# Patient Record
Sex: MALE | Race: WHITE | NOT HISPANIC OR LATINO
[De-identification: names, ages, dates, MRNs, and addresses within clinical notes are randomized per-mention and may not be internally consistent; named-entity substitution may affect disease eponyms.]

---

## 2017-01-04 ENCOUNTER — TRANSCRIPTION ENCOUNTER (OUTPATIENT)
Age: 47
End: 2017-01-04

## 2022-05-01 ENCOUNTER — EMERGENCY (EMERGENCY)
Facility: HOSPITAL | Age: 52
LOS: 1 days | Discharge: HOME | End: 2022-05-01
Attending: EMERGENCY MEDICINE
Payer: COMMERCIAL

## 2022-05-01 VITALS
SYSTOLIC BLOOD PRESSURE: 152 MMHG | WEIGHT: 240.08 LBS | DIASTOLIC BLOOD PRESSURE: 103 MMHG | HEIGHT: 70 IN | OXYGEN SATURATION: 98 % | HEART RATE: 89 BPM | RESPIRATION RATE: 18 BRPM

## 2022-05-01 LAB
BASOPHILS # BLD AUTO: 0.1 K/UL — SIGNIFICANT CHANGE UP (ref 0–0.2)
BASOPHILS NFR BLD AUTO: 1 % — SIGNIFICANT CHANGE UP (ref 0–2)
EOSINOPHIL # BLD AUTO: 0.15 K/UL — SIGNIFICANT CHANGE UP (ref 0–0.5)
EOSINOPHIL NFR BLD AUTO: 1.5 % — SIGNIFICANT CHANGE UP (ref 0–6)
HCT VFR BLD CALC: 44.1 % — SIGNIFICANT CHANGE UP (ref 39–50)
HGB BLD-MCNC: 15 G/DL — SIGNIFICANT CHANGE UP (ref 13–17)
IMM GRANULOCYTES NFR BLD AUTO: 0.7 % — SIGNIFICANT CHANGE UP (ref 0–1.5)
LYMPHOCYTES # BLD AUTO: 2.01 K/UL — SIGNIFICANT CHANGE UP (ref 1–3.3)
LYMPHOCYTES # BLD AUTO: 20.7 % — SIGNIFICANT CHANGE UP (ref 13–44)
MCHC RBC-ENTMCNC: 32.5 PG — SIGNIFICANT CHANGE UP (ref 27–34)
MCHC RBC-ENTMCNC: 34 GM/DL — SIGNIFICANT CHANGE UP (ref 32–36)
MCV RBC AUTO: 95.7 FL — SIGNIFICANT CHANGE UP (ref 80–100)
MONOCYTES # BLD AUTO: 0.7 K/UL — SIGNIFICANT CHANGE UP (ref 0–0.9)
MONOCYTES NFR BLD AUTO: 7.2 % — SIGNIFICANT CHANGE UP (ref 2–14)
NEUTROPHILS # BLD AUTO: 6.7 K/UL — SIGNIFICANT CHANGE UP (ref 1.8–7.4)
NEUTROPHILS NFR BLD AUTO: 68.9 % — SIGNIFICANT CHANGE UP (ref 43–77)
PLATELET # BLD AUTO: 289 K/UL — SIGNIFICANT CHANGE UP (ref 150–400)
RBC # BLD: 4.61 M/UL — SIGNIFICANT CHANGE UP (ref 4.2–5.8)
RBC # FLD: 13.1 % — SIGNIFICANT CHANGE UP (ref 10.3–14.5)
WBC # BLD: 9.73 K/UL — SIGNIFICANT CHANGE UP (ref 3.8–10.5)
WBC # FLD AUTO: 9.73 K/UL — SIGNIFICANT CHANGE UP (ref 3.8–10.5)

## 2022-05-01 PROCEDURE — 30903 CONTROL OF NOSEBLEED: CPT | Mod: RT

## 2022-05-01 PROCEDURE — 36415 COLL VENOUS BLD VENIPUNCTURE: CPT

## 2022-05-01 PROCEDURE — 85025 COMPLETE CBC W/AUTO DIFF WBC: CPT

## 2022-05-01 PROCEDURE — 99284 EMERGENCY DEPT VISIT MOD MDM: CPT | Mod: 25

## 2022-05-01 PROCEDURE — 30903 CONTROL OF NOSEBLEED: CPT

## 2022-05-01 PROCEDURE — 96374 THER/PROPH/DIAG INJ IV PUSH: CPT | Mod: XU

## 2022-05-01 RX ORDER — CEPHALEXIN 500 MG
1 CAPSULE ORAL
Qty: 14 | Refills: 0
Start: 2022-05-01 | End: 2022-05-07

## 2022-05-01 RX ORDER — ONDANSETRON 8 MG/1
4 TABLET, FILM COATED ORAL ONCE
Refills: 0 | Status: COMPLETED | OUTPATIENT
Start: 2022-05-01 | End: 2022-05-01

## 2022-05-01 RX ORDER — TRANEXAMIC ACID 100 MG/ML
5 INJECTION, SOLUTION INTRAVENOUS ONCE
Refills: 0 | Status: DISCONTINUED | OUTPATIENT
Start: 2022-05-01 | End: 2022-05-05

## 2022-05-01 RX ORDER — TRANEXAMIC ACID 100 MG/ML
5 INJECTION, SOLUTION INTRAVENOUS ONCE
Refills: 0 | Status: COMPLETED | OUTPATIENT
Start: 2022-05-01 | End: 2022-05-01

## 2022-05-01 RX ADMIN — TRANEXAMIC ACID 5 MILLILITER(S): 100 INJECTION, SOLUTION INTRAVENOUS at 07:50

## 2022-05-01 RX ADMIN — ONDANSETRON 4 MILLIGRAM(S): 8 TABLET, FILM COATED ORAL at 10:00

## 2022-05-01 NOTE — ED ADULT NURSE NOTE - NSIMPLEMENTINTERV_GEN_ALL_ED
Implemented All Universal Safety Interventions:  [place] to call system. Call bell, personal items and telephone within reach. Instruct patient to call for assistance. Room bathroom lighting operational. Non-slip footwear when patient is off stretcher. Physically safe environment: no spills, clutter or unnecessary equipment. Stretcher in lowest position, wheels locked, appropriate side rails in place.

## 2022-05-01 NOTE — ED PROVIDER NOTE - NOSE [+], MLM
[>50% of Time Spent on Counseling for ____] : Greater than 50% of the encounter time was spent on counseling for [unfilled] [Time Spent: ___ minutes] : I have spent [unfilled] minutes of face to face time with the patient EPISTAXIS

## 2022-05-01 NOTE — ED PROCEDURE NOTE - CPROC ED TIME OUT STATEMENT1
“Patient's name, , procedure and correct site were confirmed during the [facility] Timeout.”
“Patient's name, , procedure and correct site were confirmed during the [facility] Timeout.”

## 2022-05-01 NOTE — ED PROVIDER NOTE - CLINICAL SUMMARY MEDICAL DECISION MAKING FREE TEXT BOX
Pt is a 51y M with no PMH presenting for R sided epistaxis since this morning. Will place rhinorocket and have f/u with ENT.

## 2022-05-01 NOTE — ED PROVIDER NOTE - OBJECTIVE STATEMENT
Pt is a 51y M with no PMH presenting for epistaxis to the R nare. Pt states he had a few episodes of bleeding 2 mornings ago and it stopped. This again happened yesterday morning and again the bleeding had subsided. Pt states he has had bleeding since this morning. Reports blood is going to the back of throat. He denies any blood thinner medication/ trauma. Pt has bleeding to the R nare. No other complaints.

## 2022-05-01 NOTE — ED PROVIDER NOTE - PROGRESS NOTE DETAILS
Rhino rocket placed to R nare. Pt started to bleed again. rhinorocket removed and miracel placed. Pt started to bleed again. rhinorocket removed and merocel placed. Pts bleeding has stopped. Merocel in place and pt tolerating it Pts bleeding has stopped. Merocel in place and pt tolerating it. Will dc with abx and ENT f/u wihtin 48-72 hours.

## 2022-05-01 NOTE — ED PROVIDER NOTE - ATTENDING CONTRIBUTION TO CARE
51yoM; with no signif PMH; now p/w right nare epistaxis.  patient reports having nasal congestion and sore throat for 1 month, with increasing rhinorrhea. patient reports epistaxis of right nare began 2 days ago, intermittent, but recurrent bleeding today. denies n/v. denies headache. denies trauma. denies fever.  Gen: Alert, NAD  Head: NC, AT, PERRL, EOMI, normal lids/conjunctiva  ENT: blood in right nare, no blood in posterior oropharynx  Neck: +supple, no tenderness/meningismus/JVD, +Trachea midline  Pulm: Bilateral BS, normal resp effort, no wheeze/stridor/retractions  CV: RRR, no M/R/G, 2+dist pulses  A/P:  51yoM p/w epistaxis  -rapid rhino  -observe  -f/up with ENT.

## 2022-05-01 NOTE — ED PROVIDER NOTE - NSFOLLOWUPINSTRUCTIONS_ED_ALL_ED_FT
Follow up with ENT within 72 hours.   Take antibiotic as prescribed.  Do not blow nose or move merocel.  Come back if bleeding restarts.  Tylenol/motrin for pain.    Epistaxis    Epistaxis is the medical term for a nosebleed. Nosebleeds are common and can be caused by many conditions, such as injury, infections, dry environments, medicines, nose picking, and home heating and cooling systems. Try controlling your nosebleed by pinching your nose continuously for at least 10 minutes. Avoid lying down while you are having a nosebleed. Sit up and lean forward. Avoid blowing or sniffing your nose for a number of hours after having a nosebleed. Resume your normal activities as you are able, but avoid straining, lifting, or bending at the waist for several days. Maintain humidity in your home by using less air conditioning or by using a humidifier.     If your nose was packed by your health care provider, keep the packing inside of your nose until a health care provider removes it. If a balloon catheter was used to pack your nose, do not cut or remove it unless your health care provider has instructed you to do that.     Aspirin and blood thinners make bleeding more likely. If you are prescribed these medicines and you suffer from nosebleeds, ask your health care provider if you should stop taking the medicines or adjust the dose. Do not stop medicines unless directed by your health care provider.    SEEK IMMEDIATE MEDICAL CARE IF YOU HAVE ANY OF THE FOLLOWING SYMPTOMS: nosebleed lasting longer than 20 minutes, unusual bleeding from or bruising on other parts of your body, dizziness or lightheadedness, fainting, nosebleed occurring after a head injury, or fever.

## 2022-05-01 NOTE — ED ADULT NURSE NOTE - OBJECTIVE STATEMENT
Pt. c/o nosebleed that began this morning at approximately 4am.  Pt. states he had similar episode on friday at 4am that resolved on own.  Pt states "its like a steady stream and I am choking on it."  Negative trauma to area.  Negative blood thinners.  Pressure applied with gauze to area without relief.

## 2022-05-01 NOTE — ED ADULT TRIAGE NOTE - CHIEF COMPLAINT QUOTE
pt c/o nose bleed started 2 days ago, 4am, started again this am,   awake alert, + bleeding noted denies blood thinners

## 2022-05-01 NOTE — ED PROVIDER NOTE - PATIENT PORTAL LINK FT
You can access the FollowMyHealth Patient Portal offered by [facility] by registering at the following website: [email or web address]. By joining [facility]’s FollowMyHealth portal, you will also be able to view your health information using other applications (apps) compatible with our system.

## 2022-05-01 NOTE — ED PROVIDER NOTE - CARE PROVIDER_API CALL
Iron Lilly)  Otolaryngology  [address] [place] [address]  Phone: [phone]  Fax: [phone]  Follow Up Time:

## 2022-06-23 PROBLEM — Z00.00 ENCOUNTER FOR PREVENTIVE HEALTH EXAMINATION: Status: ACTIVE | Noted: 2022-06-23

## 2022-12-20 ENCOUNTER — APPOINTMENT (OUTPATIENT)
Dept: PAIN MANAGEMENT | Facility: CLINIC | Age: 52
End: 2022-12-20

## 2022-12-20 VITALS — BODY MASS INDEX: 34.36 KG/M2 | HEIGHT: 70 IN | WEIGHT: 240 LBS

## 2022-12-20 PROCEDURE — 99214 OFFICE O/P EST MOD 30 MIN: CPT

## 2022-12-20 NOTE — HISTORY OF PRESENT ILLNESS
[Neck] : neck [9] : 9 [Radiating] : radiating [Sharp] : sharp [Tingling] : tingling [Constant] : constant [Sleep] : sleep [Injection therapy] : injection therapy [Nothing helps with pain getting better] : Nothing helps with pain getting better [Lying in bed] : lying in bed [de-identified] : 12/20/22: follow up today.  Last 4 months months left arm is numb and has pain into left side of head.  Will set up for BRIAN\par \par 4/28/21: follow up today. had BRIAN on 8/25/20. Pain is in left side of neck radiating up head.\par \par 8/19/20: Patient presents for initial evaluation. he complains of worsening neck pain since March. He was doing push\par ups in the yard with his son and the next day pain got worse. He is tapering off of Narcotics with Dr. Foster. Had RFA's in\par the past. His pain is currently in the midline of his neck with radiation do the left anterior chest.\par \par \par MRI C spine: (8/11/20) Impression:\par 1. Similar convexity of the lower cervical spine towards the right and straightening of the cervical lordosis with\par progression of multilevel disc pathology, left exiting C4 nerve root impinging with severe left foraminal narrowing at C3-\par C4, impingement upon the right greater than left exiting C5 nerve roots at C4-C5, impingement upon bilateral exiting C6\par nerve roots at C5-C6, impingement upon bilateral C7 nerve roots at C6-C7, and impingement upon left exiting C8 nerve\par root at C7-T1 with multilevel foraminal narrowing.\par 2 No evidence of acute fracture or cord compression [] : no [FreeTextEntry6] : numbness  [FreeTextEntry7] : left shoulder down to the fingers

## 2022-12-20 NOTE — ASSESSMENT
[FreeTextEntry1] : C7-T1 Cervical Epidural Steroid Injection under fluoroscopic guidance with image.\par

## 2022-12-26 ENCOUNTER — FORM ENCOUNTER (OUTPATIENT)
Age: 52
End: 2022-12-26

## 2022-12-27 ENCOUNTER — APPOINTMENT (OUTPATIENT)
Dept: MRI IMAGING | Facility: CLINIC | Age: 52
End: 2022-12-27
Payer: COMMERCIAL

## 2022-12-27 PROCEDURE — 72141 MRI NECK SPINE W/O DYE: CPT

## 2022-12-28 LAB — SARS-COV-2 N GENE NPH QL NAA+PROBE: NOT DETECTED

## 2022-12-29 ENCOUNTER — APPOINTMENT (OUTPATIENT)
Age: 52
End: 2022-12-29
Payer: COMMERCIAL

## 2022-12-29 PROCEDURE — 62321 NJX INTERLAMINAR CRV/THRC: CPT

## 2022-12-30 ENCOUNTER — APPOINTMENT (OUTPATIENT)
Dept: ORTHOPEDIC SURGERY | Facility: CLINIC | Age: 52
End: 2022-12-30
Payer: COMMERCIAL

## 2022-12-30 VITALS — WEIGHT: 240 LBS | BODY MASS INDEX: 34.36 KG/M2 | HEIGHT: 70 IN

## 2022-12-30 DIAGNOSIS — G56.02 CARPAL TUNNEL SYNDROME, LEFT UPPER LIMB: ICD-10-CM

## 2022-12-30 DIAGNOSIS — M48.02 SPINAL STENOSIS, CERVICAL REGION: ICD-10-CM

## 2022-12-30 DIAGNOSIS — E78.00 PURE HYPERCHOLESTEROLEMIA, UNSPECIFIED: ICD-10-CM

## 2022-12-30 PROCEDURE — 99214 OFFICE O/P EST MOD 30 MIN: CPT

## 2023-01-11 ENCOUNTER — APPOINTMENT (OUTPATIENT)
Dept: PAIN MANAGEMENT | Facility: CLINIC | Age: 53
End: 2023-01-11

## 2023-02-06 NOTE — ASSESSMENT
[FreeTextEntry1] : 53 y/o male with multi level DDD, C4-6 severe stenosis. If not improving with epidurals over time could consider a C4-6 ACDF. The radiating symptoms to the skull seem to be closes related to facet degeneration and consideration for MBB RFA would be a good choice. Left upper extremity parasthenia seem to be closely related to ulnar neuropathy. Patient was referred to receive an EMG and NCV. Follow up PRN. \par \par Prior to appointment and during encounter with patient extensive medical records were reviewed including but not limited to, hospital records, outpatient records, imaging results, and lab data.During this appointment the patient was examined, diagnoses were discussed and explained in a face to face manner. In addition extensive time was spent reviewing aforementioned diagnostic studies. Counseling including abnormal image results, differential diagnoses, treatment options, risk and benefits, lifestyle changes, current condition, and current medications was performed.Patient's comments, questions, and concerns were addressed and patient verbalized understanding. Based on this patient's presentation at our office, which is an orthopedic spine surgeon's office, this patient inherently / intrinsically has a risk, however minute, of developing issues such as Cauda equina syndrome, bowel and bladder changes, or progression of motor or neurological deficits such as paralysis which may be permanent.\par  \par The documentation recorded by the scribe accurately reflects the service I personally performed and the decisions made by me.\par KRISTI, Raza Sims, attest that this documentation has been prepared under the direction and in the presence of Provider Ronnie Anderson MD

## 2023-02-06 NOTE — DATA REVIEWED
[MRI] : MRI [Cervical Spine] : cervical spine [Report was reviewed and noted in the chart] : The report was reviewed and noted in the chart [I independently reviewed and interpreted images and report] : I independently reviewed and interpreted images and report [I reviewed the films/CD and additionally noted] : I reviewed the films/CD and additionally noted [FreeTextEntry1] : 12/27/22 [facility]\par C2-3: mild DDD, mod facet DJD, mod L foraminal stenosis \par C3-4: adv L facet OA, mod to severe L foraminal stenosis \par C4-5: adv left facet OA, severe b/l foraminal stenosis, mod central stenosis, abutment of spinal cord \par C5-6: mod central stenosis and mild spinal cord compression, no T1 or T2 signal change, severe b/l foraminal stenosis\par C6-7: mod DDD, central disc protrusion, mod central stenosis, mod to severe R foraminal stenosis \par C7-T1: mild listhesis with a mild L foraminal stenosis \par compared to 2020 minimal progression

## 2023-02-06 NOTE — HISTORY OF PRESENT ILLNESS
[8] : 8 [6] : 6 [Full time] : Work status: full time [de-identified] : 22: 53 y/o male presenting today with left arm and elbow pain/numbness. Dr. Fuentes cervical epidural 22. Numbness from the elbow down, 2 months ago heard cracking and feeling a shock when turning right fast. In the past, epidural have provided significant relief for 5-8 months. Patient is returning to [place] on 2023. Father  of brain aneurisms. \par \par 2020: 50 y/o male retired  presents for cervical spine MRI review. Patient has complaints of neck pain that has been\par present since 2020. He was doing pushups and then the next morning he woke up with severe pain and stiffness\par in the neck. He states that he previously had pain traveling down the arms. Patient currently complains of a tingling\par traveling from the left elbow to the left hand when he bends over. Patient has tried cervical spine MBB/RFA with Dr.\par Fuentes. Patient takes Aleve daily for pain relief. \par \par Patient is s/p lumbar fusion from L3-S1 with DLIF at L3-4 and ALIF at L4-5 and L5-S1 with Dr. Matute in  and .\par Pain Management: Dr. Cervantes, Dr. Fuentes. \par \par Spine History:\par 2/15/19 L3-S1 MBB.\par 19 right C3-5 RFA.\par 19 L3-S1 MBB.\par 18 C2-6 MBB.\par 10/19/18 C2-6 MBB. [] : no [FreeTextEntry5] : p [de-identified] : pain mgmt- epidural injection, chiropractor [de-identified] : radha

## 2023-02-06 NOTE — PHYSICAL EXAM
[Flexion] : flexion [Extension] : extension [Rotation to left] : rotation to left [Rotation to right] : rotation to right [5___] : right grasp 5[unfilled]/5 [de-identified] : Constitutional:\par - General Appearance:\par Unremarkable\par Body Habitus\par Well Developed\par Well Nourished\par Body Habitus\par No Deformities\par Well Groomed\par Ability To communicate:\par Normal\par Neurologic:\par Global sensation is intact to upper and lower extremities. See examination of Neck and/or Spinefor exceptions.\par Orientation to Time, Place and Person is: Normal\par Mood And Affect is Normal\par Skin:\par - Head/Face, Right Upper/Lower Extremity, Left Upper/Lower Extremity: Normal\par See Examination of Neck and/or Spine for exceptions\par Cardiovascular:\par Peripheral Cardiovascular System is Normal\par Palpation of Lymph Nodes:\par Normal Palpation of lymph nodes in: Axilla, Cervical, Inguinal\par Abnormal Palpation of lymph nodes in: None  [] : negative Isbell reflex [de-identified] : 3+ reflexes in patella

## 2023-06-12 ENCOUNTER — APPOINTMENT (OUTPATIENT)
Dept: PAIN MANAGEMENT | Facility: CLINIC | Age: 53
End: 2023-06-12
Payer: COMMERCIAL

## 2023-06-12 VITALS — BODY MASS INDEX: 35.22 KG/M2 | HEIGHT: 70 IN | WEIGHT: 246 LBS

## 2023-06-12 PROCEDURE — 99214 OFFICE O/P EST MOD 30 MIN: CPT

## 2023-06-12 NOTE — HISTORY OF PRESENT ILLNESS
[Neck] : neck [Radiating] : radiating [Sharp] : sharp [Tingling] : tingling [Constant] : constant [Sleep] : sleep [Injection therapy] : injection therapy [Nothing helps with pain getting better] : Nothing helps with pain getting better [Lying in bed] : lying in bed [10] : 10 [7] : 7 [Household chores] : household chores [FreeTextEntry6] : numbness  [] : no [FreeTextEntry7] : left shoulder down to the fingers  [de-identified] : 6/12/23- Has pain in neck and into left occiput.  \par \par 12/20/22: follow up today.  Last 4 months months left arm is numb and has pain into left side of head.  Will set up for BRIAN\par \par 4/28/21: follow up today. had BRIAN on 8/25/20. Pain is in left side of neck radiating up head.\par \par 8/19/20: Patient presents for initial evaluation. he complains of worsening neck pain since March. He was doing push\par ups in the yard with his son and the next day pain got worse. He is tapering off of Narcotics with Dr. Leslie Had RFA's in\par the past. His pain is currently in the midline of his neck with radiation do the left anterior chest.\par \par \par MRI C spine: (8/11/20) Impression:\par 1. Similar convexity of the lower cervical spine towards the right and straightening of the cervical lordosis with\par progression of multilevel disc pathology, left exiting C4 nerve root impinging with severe left foraminal narrowing at C3-\par C4, impingement upon the right greater than left exiting C5 nerve roots at C4-C5, impingement upon bilateral exiting C6\par nerve roots at C5-C6, impingement upon bilateral C7 nerve roots at C6-C7, and impingement upon left exiting C8 nerve\par root at C7-T1 with multilevel foraminal narrowing.\par 2 No evidence of acute fracture or cord compression

## 2023-06-12 NOTE — ASSESSMENT
[FreeTextEntry1] : C7-T1 Cervical Epidural Steroid Injection under fluoroscopic guidance with image.\par \par Patient presents with axial cervical pain that has not responded to  3 months of conservative therapy including physical therapy or NSAID therapy.  The pain is interfering with activities of daily living and functionality.   There is no radicular pain.   The pain is exacerbated by facet loading.  The patient has not had a vertebral fusion at the levels of the proposed treatment.  There is no unexplained neurologic deficit.  There is no history of systemic infection, unstable medical condition, bleeding tendency, or local infection.  The injection is being performed to diagnose the facet joint as the source of the individual's pain.\par \par \par left C2-C5 MBB #2.

## 2023-06-23 ENCOUNTER — APPOINTMENT (OUTPATIENT)
Age: 53
End: 2023-06-23
Payer: COMMERCIAL

## 2023-06-23 PROCEDURE — 64492 INJ PARAVERT F JNT C/T 3 LEV: CPT | Mod: 59,LT

## 2023-06-23 PROCEDURE — 64490 INJ PARAVERT F JNT C/T 1 LEV: CPT | Mod: LT

## 2023-06-23 PROCEDURE — 64491 INJ PARAVERT F JNT C/T 2 LEV: CPT | Mod: 59,LT

## 2023-06-27 ENCOUNTER — APPOINTMENT (OUTPATIENT)
Dept: PAIN MANAGEMENT | Facility: CLINIC | Age: 53
End: 2023-06-27
Payer: COMMERCIAL

## 2023-06-27 PROCEDURE — 62321 NJX INTERLAMINAR CRV/THRC: CPT

## 2023-06-27 NOTE — PROCEDURE
[FreeTextEntry3] : Date of Service: 06/27/2023 \par \par Account: [id number]\par \par Patient: EDEL DURHAM \par \par YOB: 1970\par \par Age: 52 year\par \par \par Surgeon:                                                      Cinthia Fuentes M.D.\par \par Pre-Operative Diagnosis:                          Cervical Radiculopathy (M54.12) \par \par Post Operative Diagnosis:                        Cervical Radiculopathy (M54.12)\par \par Procedure:                                                  Interlaminar cervical epidural steroid injection (C7-T1) under fluoroscopic guidance\par \par Anesthesia:                                                 MAC\par \par \par This procedure was carried out using fluoroscopic guidance.  The risks and benefits of the procedure were discussed extensively with the patient.  The consent of the patient was obtained and the following procedure was performed.\par \par The patient was placed in the prone position using a thoracic and chin support.  The cervical area was prepped and draped in a sterile fashion.  The fluoroscope visualized the C7-T1 interspace using slight cephalad-caudad angulation and this area was marked.  Using sterile technique the superficial skin was anesthetized with 1% Lidocaine without epinephrine.  A 18 gauge Tuohoy needle was advanced under fluoroscopy using peavs-ybrerudqu-xerca technique until ligament was engaged.  The stilette was then removed and a column of preservative free normal saline flushed throught the tuohoy needle and left with a concave fluid level above the butterfly portion of the tuohoy needle.  The needle was then advanced under fluoroscopic guidance until the column of saline disappeared.  Lateral view confirmed final needle tip placement in the epidural space.  After negative aspiration for heme and CSF, 1 cc of Omnipaque confirmed good cervical epiduragram. \par \par Cervical epidurogram showed no evidence of intrathecal or intravascular flow, and good bilateral epidural flow from C3 to T2 levels.  An injectate of 6 cc of preservative free normal saline plus 12 mg of betamethasone was then injected into the epidural space. The needle was subsequently removed and pressure was applied.\par \par Anesthesia personnel were present throughout the procedure.  The patient tolerated the procedure well and was instructed to contact me immediately if there were any problems.\par \par Cinthia Fuentes M.D.\par

## 2023-07-12 ENCOUNTER — APPOINTMENT (OUTPATIENT)
Dept: PAIN MANAGEMENT | Facility: CLINIC | Age: 53
End: 2023-07-12
Payer: COMMERCIAL

## 2023-07-12 VITALS — BODY MASS INDEX: 35.93 KG/M2 | WEIGHT: 251 LBS | HEIGHT: 70 IN

## 2023-07-12 PROCEDURE — J3490M: CUSTOM

## 2023-07-12 PROCEDURE — 64405 NJX AA&/STRD GR OCPL NRV: CPT

## 2023-07-12 PROCEDURE — 99214 OFFICE O/P EST MOD 30 MIN: CPT | Mod: 25

## 2023-07-12 NOTE — DATA REVIEWED
[Cervical Spine] : cervical spine [Report was reviewed and noted in the chart] : The report was reviewed and noted in the chart

## 2023-07-12 NOTE — PROCEDURE
[Other: ____] : [unfilled] [Left] : of the left [Pain] : pain [Alcohol] : alcohol [Betadine] : betadine [___ cc    6mg] :  Betamethasone (Celestone) ~Vcc of 6mg [___ cc    0.25%] : Bupivacaine (Marcaine) ~Vcc of 0.25%  [Call if redness, pain or fever occur] : call if redness, pain or fever occur [Risks, benefits, alternatives discussed / Verbal consent obtained] : the risks benefits, and alternatives have been discussed, and verbal consent was obtained [de-identified] : Occipital Nerve purakc

## 2023-07-12 NOTE — HISTORY OF PRESENT ILLNESS
[Neck] : neck [10] : 10 [7] : 7 [Radiating] : radiating [Tingling] : tingling [Constant] : constant [Household chores] : household chores [Sleep] : sleep [Injection therapy] : injection therapy [Nothing helps with pain getting better] : Nothing helps with pain getting better [Lying in bed] : lying in bed [Tightness] : tightness [] : no [FreeTextEntry6] : numbness  [FreeTextEntry7] : left shoulder down to the fingers  [de-identified] : getting out of bed  [de-identified] : 07/12/2023: follow up today after BRIAN on 6/27/23. 7/12/2023: 90% relief for pain; 0% relief for numbness  Pain radiates cranially. \par \par 6/12/23- Has pain in neck and into left occiput.  \par \par 12/20/22: follow up today.  Last 4 months months left arm is numb and has pain into left side of head.  Will set up for BRIAN\par \par 4/28/21: follow up today. had BRIAN on 8/25/20. Pain is in left side of neck radiating up head.\par \par 8/19/20: Patient presents for initial evaluation. he complains of worsening neck pain since March. He was doing push\par ups in the yard with his son and the next day pain got worse. He is tapering off of Narcotics with Dr. Mariama Brasher RFA's in\par the past. His pain is currently in the midline of his neck with radiation do the left anterior chest.\par \par \par MRI C spine: (8/11/20) Impression:\par 1. Similar convexity of the lower cervical spine towards the right and straightening of the cervical lordosis with\par progression of multilevel disc pathology, left exiting C4 nerve root impinging with severe left foraminal narrowing at C3-\par C4, impingement upon the right greater than left exiting C5 nerve roots at C4-C5, impingement upon bilateral exiting C6\par nerve roots at C5-C6, impingement upon bilateral C7 nerve roots at C6-C7, and impingement upon left exiting C8 nerve\par root at C7-T1 with multilevel foraminal narrowing.\par 2 No evidence of acute fracture or cord compression

## 2023-07-12 NOTE — ASSESSMENT
[FreeTextEntry1] : After discussing various treatment options with the patient including but not limited to oral medications, physical therapy, exercise, modalities as well as interventional spinal injections, we have decided with the following plan:\par \par 1) Intervention Injection Therapy:\par I personally reviewed the MRI/CT scan images and agree with the radiologist's report. The radiological findings were discussed with the patient.\par The risks, benefits, contents and alternatives to injection were explained in full to the patient. Risks outlined include but are not limited to infection,sepsis, bleeding, post-dural puncture headache, nerve damage, temporary increase in pain, syncopal episode, failure to resolve symptoms, allergic reaction, symptom recurrence, and elevation of blood sugar in diabetics. Cortisone may cause immunosuppression. Patient understands the risks. All questions were answered. After discussion of options, patient requested an injection. Information regarding the injection was given to the patient. Which medications to stop prior to the injection was explained to the patient as well.\par \par Follow up in 1-2 weeks post injection for re-evaluation. \par Continue Home exercises, stretching, activity modification, physical therapy, and conservative care.\par \par Patient is presenting with acute/sub-acute radicular pain with impairment in ADLs and functionality.  The pain has not responded sufficiently to  conservative care including nsaid therapy and/or physical therapy.  There is no bleeding tendency, unstable medical condition, or systemic infection. The purpose of the spinal injections is to facilitate active therapy by providing short term relief through reduction of pain and inflammation. \par \par Injections, by themselves, are not likely to provide long-term relief. Rather, active rehabilitation with modified work achieves long-term relief by increasing active ROM, strength and stability. \par \par Eileen will call \par \par 2) Left Occipital nerve block today. \par \par 3) consider neuropathic meds\par \par 4) need emg/ncv to r/o cervical radic vs cubital tunnel

## 2023-07-24 ENCOUNTER — APPOINTMENT (OUTPATIENT)
Dept: NEUROLOGY | Facility: CLINIC | Age: 53
End: 2023-07-24
Payer: COMMERCIAL

## 2023-07-24 DIAGNOSIS — G56.22 LESION OF ULNAR NERVE, LEFT UPPER LIMB: ICD-10-CM

## 2023-07-24 PROCEDURE — 95912 NRV CNDJ TEST 11-12 STUDIES: CPT

## 2023-07-24 PROCEDURE — 95886 MUSC TEST DONE W/N TEST COMP: CPT

## 2023-08-14 ENCOUNTER — EMERGENCY (EMERGENCY)
Facility: HOSPITAL | Age: 53
LOS: 1 days | Discharge: HOME | End: 2023-08-14
Attending: EMERGENCY MEDICINE
Payer: COMMERCIAL

## 2023-08-14 VITALS
TEMPERATURE: 99 F | HEIGHT: 70 IN | WEIGHT: 225.97 LBS | HEART RATE: 62 BPM | RESPIRATION RATE: 20 BRPM | OXYGEN SATURATION: 99 % | DIASTOLIC BLOOD PRESSURE: 103 MMHG | SYSTOLIC BLOOD PRESSURE: 175 MMHG

## 2023-08-14 DIAGNOSIS — Z90.49 ACQUIRED ABSENCE OF OTHER SPECIFIED PARTS OF DIGESTIVE TRACT: Chronic | ICD-10-CM

## 2023-08-14 LAB
ALBUMIN SERPL ELPH-MCNC: 4 G/DL — SIGNIFICANT CHANGE UP (ref 3.3–5.2)
ALP SERPL-CCNC: 67 U/L — SIGNIFICANT CHANGE UP (ref 40–120)
ALT FLD-CCNC: 29 U/L — SIGNIFICANT CHANGE UP
ANION GAP SERPL CALC-SCNC: 12 MMOL/L — SIGNIFICANT CHANGE UP (ref 5–17)
APPEARANCE UR: CLEAR — SIGNIFICANT CHANGE UP
AST SERPL-CCNC: 24 U/L — SIGNIFICANT CHANGE UP
BACTERIA # UR AUTO: ABNORMAL
BASOPHILS # BLD AUTO: 0.09 K/UL — SIGNIFICANT CHANGE UP (ref 0–0.2)
BASOPHILS NFR BLD AUTO: 0.6 % — SIGNIFICANT CHANGE UP (ref 0–2)
BILIRUB SERPL-MCNC: 0.5 MG/DL — SIGNIFICANT CHANGE UP (ref 0.4–2)
BILIRUB UR-MCNC: NEGATIVE — SIGNIFICANT CHANGE UP
BUN SERPL-MCNC: 19.2 MG/DL — SIGNIFICANT CHANGE UP (ref 8–20)
CALCIUM SERPL-MCNC: 9.3 MG/DL — SIGNIFICANT CHANGE UP (ref 8.4–10.5)
CHLORIDE SERPL-SCNC: 104 MMOL/L — SIGNIFICANT CHANGE UP (ref 96–108)
CO2 SERPL-SCNC: 22 MMOL/L — SIGNIFICANT CHANGE UP (ref 22–29)
COLOR SPEC: YELLOW — SIGNIFICANT CHANGE UP
CREAT SERPL-MCNC: 0.95 MG/DL — SIGNIFICANT CHANGE UP (ref 0.5–1.3)
DIFF PNL FLD: ABNORMAL
EGFR: 95 ML/MIN/1.73M2 — SIGNIFICANT CHANGE UP
EOSINOPHIL # BLD AUTO: 0.1 K/UL — SIGNIFICANT CHANGE UP (ref 0–0.5)
EOSINOPHIL NFR BLD AUTO: 0.7 % — SIGNIFICANT CHANGE UP (ref 0–6)
EPI CELLS # UR: SIGNIFICANT CHANGE UP
GLUCOSE SERPL-MCNC: 110 MG/DL — HIGH (ref 70–99)
GLUCOSE UR QL: NEGATIVE MG/DL — SIGNIFICANT CHANGE UP
HCT VFR BLD CALC: 42.9 % — SIGNIFICANT CHANGE UP (ref 39–50)
HGB BLD-MCNC: 15.3 G/DL — SIGNIFICANT CHANGE UP (ref 13–17)
IMM GRANULOCYTES NFR BLD AUTO: 0.3 % — SIGNIFICANT CHANGE UP (ref 0–0.9)
KETONES UR-MCNC: ABNORMAL
LEUKOCYTE ESTERASE UR-ACNC: ABNORMAL
LYMPHOCYTES # BLD AUTO: 1.32 K/UL — SIGNIFICANT CHANGE UP (ref 1–3.3)
LYMPHOCYTES # BLD AUTO: 8.6 % — LOW (ref 13–44)
MCHC RBC-ENTMCNC: 33.2 PG — SIGNIFICANT CHANGE UP (ref 27–34)
MCHC RBC-ENTMCNC: 35.7 GM/DL — SIGNIFICANT CHANGE UP (ref 32–36)
MCV RBC AUTO: 93.1 FL — SIGNIFICANT CHANGE UP (ref 80–100)
MONOCYTES # BLD AUTO: 1.29 K/UL — HIGH (ref 0–0.9)
MONOCYTES NFR BLD AUTO: 8.4 % — SIGNIFICANT CHANGE UP (ref 2–14)
NEUTROPHILS # BLD AUTO: 12.45 K/UL — HIGH (ref 1.8–7.4)
NEUTROPHILS NFR BLD AUTO: 81.4 % — HIGH (ref 43–77)
NITRITE UR-MCNC: NEGATIVE — SIGNIFICANT CHANGE UP
PH UR: 6 — SIGNIFICANT CHANGE UP (ref 5–8)
PLATELET # BLD AUTO: 230 K/UL — SIGNIFICANT CHANGE UP (ref 150–400)
POTASSIUM SERPL-MCNC: 4.1 MMOL/L — SIGNIFICANT CHANGE UP (ref 3.5–5.3)
POTASSIUM SERPL-SCNC: 4.1 MMOL/L — SIGNIFICANT CHANGE UP (ref 3.5–5.3)
PROT SERPL-MCNC: 6.8 G/DL — SIGNIFICANT CHANGE UP (ref 6.6–8.7)
PROT UR-MCNC: NEGATIVE — SIGNIFICANT CHANGE UP
RBC # BLD: 4.61 M/UL — SIGNIFICANT CHANGE UP (ref 4.2–5.8)
RBC # FLD: 13.4 % — SIGNIFICANT CHANGE UP (ref 10.3–14.5)
RBC CASTS # UR COMP ASSIST: ABNORMAL /HPF (ref 0–4)
SODIUM SERPL-SCNC: 138 MMOL/L — SIGNIFICANT CHANGE UP (ref 135–145)
SP GR SPEC: 1.01 — SIGNIFICANT CHANGE UP (ref 1.01–1.02)
UROBILINOGEN FLD QL: NEGATIVE MG/DL — SIGNIFICANT CHANGE UP
WBC # BLD: 15.3 K/UL — HIGH (ref 3.8–10.5)
WBC # FLD AUTO: 15.3 K/UL — HIGH (ref 3.8–10.5)
WBC UR QL: SIGNIFICANT CHANGE UP /HPF (ref 0–5)

## 2023-08-14 PROCEDURE — 36415 COLL VENOUS BLD VENIPUNCTURE: CPT

## 2023-08-14 PROCEDURE — 96374 THER/PROPH/DIAG INJ IV PUSH: CPT

## 2023-08-14 PROCEDURE — 99285 EMERGENCY DEPT VISIT HI MDM: CPT

## 2023-08-14 PROCEDURE — 74176 CT ABD & PELVIS W/O CONTRAST: CPT | Mod: 26,ME

## 2023-08-14 PROCEDURE — 80053 COMPREHEN METABOLIC PANEL: CPT

## 2023-08-14 PROCEDURE — 81001 URINALYSIS AUTO W/SCOPE: CPT

## 2023-08-14 PROCEDURE — 85025 COMPLETE CBC W/AUTO DIFF WBC: CPT

## 2023-08-14 PROCEDURE — 99284 EMERGENCY DEPT VISIT MOD MDM: CPT | Mod: 25

## 2023-08-14 PROCEDURE — G1004: CPT

## 2023-08-14 PROCEDURE — 96376 TX/PRO/DX INJ SAME DRUG ADON: CPT

## 2023-08-14 PROCEDURE — 74176 CT ABD & PELVIS W/O CONTRAST: CPT | Mod: ME

## 2023-08-14 PROCEDURE — 96375 TX/PRO/DX INJ NEW DRUG ADDON: CPT

## 2023-08-14 RX ORDER — ONDANSETRON 8 MG/1
1 TABLET, FILM COATED ORAL
Qty: 1 | Refills: 0
Start: 2023-08-14 | End: 2023-08-17

## 2023-08-14 RX ORDER — SODIUM CHLORIDE 9 MG/ML
1000 INJECTION INTRAMUSCULAR; INTRAVENOUS; SUBCUTANEOUS ONCE
Refills: 0 | Status: COMPLETED | OUTPATIENT
Start: 2023-08-14 | End: 2023-08-14

## 2023-08-14 RX ORDER — ONDANSETRON 8 MG/1
4 TABLET, FILM COATED ORAL ONCE
Refills: 0 | Status: COMPLETED | OUTPATIENT
Start: 2023-08-14 | End: 2023-08-14

## 2023-08-14 RX ORDER — MORPHINE SULFATE 50 MG/1
4 CAPSULE, EXTENDED RELEASE ORAL ONCE
Refills: 0 | Status: DISCONTINUED | OUTPATIENT
Start: 2023-08-14 | End: 2023-08-14

## 2023-08-14 RX ORDER — OXYCODONE AND ACETAMINOPHEN 5; 325 MG/1; MG/1
1 TABLET ORAL ONCE
Refills: 0 | Status: DISCONTINUED | OUTPATIENT
Start: 2023-08-14 | End: 2023-08-14

## 2023-08-14 RX ORDER — OXYCODONE AND ACETAMINOPHEN 5; 325 MG/1; MG/1
1 TABLET ORAL
Qty: 9 | Refills: 0
Start: 2023-08-14 | End: 2023-08-16

## 2023-08-14 RX ORDER — KETOROLAC TROMETHAMINE 30 MG/ML
15 SYRINGE (ML) INJECTION ONCE
Refills: 0 | Status: DISCONTINUED | OUTPATIENT
Start: 2023-08-14 | End: 2023-08-14

## 2023-08-14 RX ORDER — IBUPROFEN 200 MG
1 TABLET ORAL
Qty: 28 | Refills: 0
Start: 2023-08-14 | End: 2023-08-20

## 2023-08-14 RX ORDER — TAMSULOSIN HYDROCHLORIDE 0.4 MG/1
1 CAPSULE ORAL
Qty: 10 | Refills: 0
Start: 2023-08-14 | End: 2023-08-23

## 2023-08-14 RX ADMIN — MORPHINE SULFATE 4 MILLIGRAM(S): 50 CAPSULE, EXTENDED RELEASE ORAL at 11:19

## 2023-08-14 RX ADMIN — Medication 15 MILLIGRAM(S): at 10:20

## 2023-08-14 RX ADMIN — ONDANSETRON 4 MILLIGRAM(S): 8 TABLET, FILM COATED ORAL at 14:52

## 2023-08-14 RX ADMIN — Medication 15 MILLIGRAM(S): at 11:19

## 2023-08-14 RX ADMIN — Medication 15 MILLIGRAM(S): at 14:52

## 2023-08-14 RX ADMIN — MORPHINE SULFATE 4 MILLIGRAM(S): 50 CAPSULE, EXTENDED RELEASE ORAL at 10:19

## 2023-08-14 RX ADMIN — SODIUM CHLORIDE 1000 MILLILITER(S): 9 INJECTION INTRAMUSCULAR; INTRAVENOUS; SUBCUTANEOUS at 10:24

## 2023-08-14 RX ADMIN — OXYCODONE AND ACETAMINOPHEN 1 TABLET(S): 5; 325 TABLET ORAL at 14:52

## 2023-08-14 RX ADMIN — ONDANSETRON 4 MILLIGRAM(S): 8 TABLET, FILM COATED ORAL at 10:19

## 2023-08-14 NOTE — ED STATDOCS - CLINICAL SUMMARY MEDICAL DECISION MAKING FREE TEXT BOX
right flank pain radiating into right mid abdomen associated with N/V, consistent with likely kidney stone, labs CT left flank pain radiating into left mid abdomen associated with N/V, consistent with likely kidney stone, labs CT left flank pain radiating into left mid abdomen associated with N/V, consistent with likely kidney stone, labs CT    Pts labs reviewed. UA negative for infection. CT scan shows 4mm kidney stone. Pt improved clinically, stable for d/c with Urology f/u.

## 2023-08-14 NOTE — ED STATDOCS - NS_ ATTENDINGSCRIBEDETAILS _ED_A_ED_FT
I, Hayder Xiao, performed the initial face to face bedside interview with this patient regarding history of present illness, review of symptoms and relevant past medical, social and family history.  I completed an independent physical examination.  I was the provider who initially evaluated this patient.  The history, relevant review of systems, past medical and surgical history, medical decision making, and physical examination was documented by the scribe in my presence and I attest to the accuracy of the documentation. Follow-up on ordered tests (ie labs, radiologic studies) and re-evaluation of the patient's status has been communicated to the ACP.  Disposition of the patient will be based on test outcome and response to ED interventions.

## 2023-08-14 NOTE — ED ADULT NURSE NOTE - NSFALLUNIVINTERV_ED_ALL_ED
Bed/Stretcher in lowest position, wheels locked, appropriate side rails in place/Call bell, personal items and telephone in reach/Instruct patient to call for assistance before getting out of bed/chair/stretcher/Non-slip footwear applied when patient is off stretcher/[place] to call system/Physically safe environment - no spills, clutter or unnecessary equipment/Purposeful proactive rounding/Room/bathroom lighting operational, light cord in reach

## 2023-08-14 NOTE — ED ADULT NURSE NOTE - OBJECTIVE STATEMENT
Assumed care of pt 1026. Pt c/o right flank pain/N/V that started yesterday. Pt denies urinary symptoms. Abdomen soft. Pt AOx3 VSS. Pt awaiting CT abdomen. Bed locked lowest position. Will continue to monitor.

## 2023-08-14 NOTE — ED STATDOCS - OBJECTIVE STATEMENT
51 y/o male with PMHx of HLD, hypothyroidism cholecystectomy, multiple back surgeries presents to the ED c/o right flank pain radiating in right side of abdomen and testicle associated with N/V since yesterday. Pt states pain feels similar in nature to gallstones. Pt notes also recently started gabapentin. 53 y/o male with PMHx of HLD, hypothyroidism cholecystectomy, multiple back surgeries presents to the ED c/o left flank pain radiating around to left mid abdomen and radiating to testicle associated with N/V.  Started yesterday while cutting up a tree and attributed to prior back problems.  Reports worsening pain today with n/v.  Pt states pain feels similar in nature to gallstones. Pt notes also recently started gabapentin.  Denies fever.  Denies urinary symptoms

## 2023-08-14 NOTE — ED STATDOCS - PATIENT PORTAL LINK FT
You can access the FollowMyHealth Patient Portal offered by [facility] by registering at the following website: [email or web address]. By joining [facility]’s FollowMyHealth portal, you will also be able to view your health information using other applications (apps) compatible with our system.

## 2023-08-14 NOTE — ED STATDOCS - CARE PROVIDER_API CALL
Ki Huffman [facility]  [address] [place] [id number]  Phone: [phone]  Fax: [phone]  Follow Up Time:

## 2023-08-14 NOTE — ED STATDOCS - CARE PROVIDERS DIRECT ADDRESSES
[email or web address]

## 2023-08-14 NOTE — ED STATDOCS - PHYSICAL EXAMINATION
Gen: uncomfortable appearing  Eyes: No scleral icterus   Abd: soft, non-distended, + right mid and tenderness  : + right CVA tenderness, no scrotal swelling, no testicular tenderness, no inguinal hernia, cremasteric reflex intact  Skin: no jaundice

## 2023-08-31 PROBLEM — E78.5 HYPERLIPIDEMIA, UNSPECIFIED: Chronic | Status: ACTIVE | Noted: 2023-08-14

## 2023-08-31 PROBLEM — G62.9 POLYNEUROPATHY, UNSPECIFIED: Chronic | Status: ACTIVE | Noted: 2023-08-14

## 2023-08-31 PROBLEM — E03.9 HYPOTHYROIDISM, UNSPECIFIED: Chronic | Status: ACTIVE | Noted: 2023-08-14

## 2023-09-06 RX ORDER — GABAPENTIN 300 MG/1
300 CAPSULE ORAL 3 TIMES DAILY
Qty: 90 | Refills: 2 | Status: ACTIVE | COMMUNITY
Start: 2023-07-27 | End: 1900-01-01

## 2023-09-06 RX ORDER — TRAMADOL HYDROCHLORIDE 50 MG/1
50 TABLET, COATED ORAL
Qty: 14 | Refills: 0 | Status: ACTIVE | COMMUNITY
Start: 2023-09-06 | End: 1900-01-01

## 2023-09-08 ENCOUNTER — APPOINTMENT (OUTPATIENT)
Dept: PAIN MANAGEMENT | Facility: CLINIC | Age: 53
End: 2023-09-08
Payer: COMMERCIAL

## 2023-09-08 VITALS — BODY MASS INDEX: 35.93 KG/M2 | WEIGHT: 251 LBS | HEIGHT: 70 IN

## 2023-09-08 DIAGNOSIS — M54.2 CERVICALGIA: ICD-10-CM

## 2023-09-08 PROCEDURE — 99214 OFFICE O/P EST MOD 30 MIN: CPT | Mod: 25

## 2023-09-08 PROCEDURE — 20552 NJX 1/MLT TRIGGER POINT 1/2: CPT

## 2023-09-08 PROCEDURE — 96372 THER/PROPH/DIAG INJ SC/IM: CPT | Mod: 59

## 2023-09-08 PROCEDURE — J3490M: CUSTOM

## 2023-09-08 NOTE — HISTORY OF PRESENT ILLNESS
[Neck] : neck [10] : 10 [7] : 7 [Radiating] : radiating [Tightness] : tightness [Tingling] : tingling [Constant] : constant [Household chores] : household chores [Sleep] : sleep [Injection therapy] : injection therapy [Nothing helps with pain getting better] : Nothing helps with pain getting better [Lying in bed] : lying in bed [FreeTextEntry1] : Patient is here today to get a TPI  in the neck.   [] : no [FreeTextEntry6] : numbness  [FreeTextEntry7] : left shoulder down to the fingers  [de-identified] : getting out of bed  [de-identified] : 09/08/2023: follow up today.  He has a BRIAN next week.  Will be seeing Dr. Mcdonough.  Will get new MRI.  Continue gabapentin.    07/12/2023: follow up today after BRIAN on 6/27/23. 7/12/2023: 90% relief for pain; 0% relief for numbness  Pain radiates cranially.   6/12/23- Has pain in neck and into left occiput.    12/20/22: follow up today.  Last 4 months months left arm is numb and has pain into left side of head.  Will set up for BRIAN  4/28/21: follow up today. had BRIAN on 8/25/20. Pain is in left side of neck radiating up head.  8/19/20: Patient presents for initial evaluation. he complains of worsening neck pain since March. He was doing push ups in the yard with his son and the next day pain got worse. He is tapering off of Narcotics with Dr. Foster. Had RFA's in the past. His pain is currently in the midline of his neck with radiation do the left anterior chest.   MRI C spine: (8/11/20) Impression: 1. Similar convexity of the lower cervical spine towards the right and straightening of the cervical lordosis with progression of multilevel disc pathology, left exiting C4 nerve root impinging with severe left foraminal narrowing at C3- C4, impingement upon the right greater than left exiting C5 nerve roots at C4-C5, impingement upon bilateral exiting C6 nerve roots at C5-C6, impingement upon bilateral C7 nerve roots at C6-C7, and impingement upon left exiting C8 nerve root at C7-T1 with multilevel foraminal narrowing. 2 No evidence of acute fracture or cord compression

## 2023-09-08 NOTE — PROCEDURE
[Pain] : pain [Alcohol] : alcohol [Betadine] : betadine [___ cc    6mg] :  Betamethasone (Celestone) ~Vcc of 6mg [___ cc    0.25%] : Bupivacaine (Marcaine) ~Vcc of 0.25%  [Call if redness, pain or fever occur] : call if redness, pain or fever occur [Risks, benefits, alternatives discussed / Verbal consent obtained] : the risks benefits, and alternatives have been discussed, and verbal consent was obtained [Therapeutic Injection] : therapeutic injection [Bilateral] : bilaterally of the [Trapezius muscle] : trapezius muscle [de-identified] : Occipital Nerve purakc

## 2023-09-13 ENCOUNTER — APPOINTMENT (OUTPATIENT)
Dept: MRI IMAGING | Facility: CLINIC | Age: 53
End: 2023-09-13
Payer: COMMERCIAL

## 2023-09-13 PROCEDURE — 72141 MRI NECK SPINE W/O DYE: CPT

## 2023-09-15 ENCOUNTER — APPOINTMENT (OUTPATIENT)
Age: 53
End: 2023-09-15
Payer: COMMERCIAL

## 2023-09-15 PROCEDURE — 62321 NJX INTERLAMINAR CRV/THRC: CPT

## 2023-09-18 ENCOUNTER — APPOINTMENT (OUTPATIENT)
Dept: ORTHOPEDIC SURGERY | Facility: CLINIC | Age: 53
End: 2023-09-18
Payer: COMMERCIAL

## 2023-09-18 VITALS — WEIGHT: 251 LBS | BODY MASS INDEX: 35.93 KG/M2 | HEIGHT: 70 IN

## 2023-09-18 DIAGNOSIS — M54.12 RADICULOPATHY, CERVICAL REGION: ICD-10-CM

## 2023-09-18 DIAGNOSIS — M50.20 OTHER CERVICAL DISC DISPLACEMENT, UNSPECIFIED CERVICAL REGION: ICD-10-CM

## 2023-09-18 PROCEDURE — 99215 OFFICE O/P EST HI 40 MIN: CPT

## 2023-09-28 ENCOUNTER — APPOINTMENT (OUTPATIENT)
Dept: PAIN MANAGEMENT | Facility: CLINIC | Age: 53
End: 2023-09-28

## 2023-10-13 ENCOUNTER — APPOINTMENT (OUTPATIENT)
Age: 53
End: 2023-10-13

## 2023-10-25 ENCOUNTER — APPOINTMENT (OUTPATIENT)
Dept: PAIN MANAGEMENT | Facility: CLINIC | Age: 53
End: 2023-10-25

## 2024-08-22 LAB — A1CG - A1C WITH ESTIMATED AVERAGE GLUCOSE: 5.5

## 2024-08-26 ENCOUNTER — NON-APPOINTMENT (OUTPATIENT)
Age: 54
End: 2024-08-26

## 2024-08-26 ENCOUNTER — APPOINTMENT (OUTPATIENT)
Dept: CARDIOLOGY | Facility: CLINIC | Age: 54
End: 2024-08-26
Payer: COMMERCIAL

## 2024-08-26 VITALS
HEIGHT: 70 IN | DIASTOLIC BLOOD PRESSURE: 84 MMHG | BODY MASS INDEX: 32.64 KG/M2 | RESPIRATION RATE: 16 BRPM | WEIGHT: 228 LBS | SYSTOLIC BLOOD PRESSURE: 138 MMHG | HEART RATE: 65 BPM | OXYGEN SATURATION: 94 %

## 2024-08-26 DIAGNOSIS — I25.10 ATHEROSCLEROTIC HEART DISEASE OF NATIVE CORONARY ARTERY W/OUT ANGINA PECTORIS: ICD-10-CM

## 2024-08-26 DIAGNOSIS — E78.5 HYPERLIPIDEMIA, UNSPECIFIED: ICD-10-CM

## 2024-08-26 DIAGNOSIS — Z82.49 FAMILY HISTORY OF ISCHEMIC HEART DISEASE AND OTHER DISEASES OF THE CIRCULATORY SYSTEM: ICD-10-CM

## 2024-08-26 DIAGNOSIS — R42 DIZZINESS AND GIDDINESS: ICD-10-CM

## 2024-08-26 DIAGNOSIS — R55 SYNCOPE AND COLLAPSE: ICD-10-CM

## 2024-08-26 PROCEDURE — 99204 OFFICE O/P NEW MOD 45 MIN: CPT

## 2024-08-26 PROCEDURE — 93000 ELECTROCARDIOGRAM COMPLETE: CPT

## 2024-08-26 PROCEDURE — G2211 COMPLEX E/M VISIT ADD ON: CPT | Mod: NC

## 2024-08-26 RX ORDER — ROSUVASTATIN CALCIUM 10 MG/1
10 TABLET, FILM COATED ORAL
Refills: 0 | Status: ACTIVE | COMMUNITY

## 2024-08-26 RX ORDER — LIOTHYRONINE SODIUM 25 UG/1
25 TABLET ORAL
Refills: 0 | Status: ACTIVE | COMMUNITY

## 2024-08-26 RX ORDER — LEVOTHYROXINE SODIUM 88 UG/1
88 TABLET ORAL
Refills: 0 | Status: ACTIVE | COMMUNITY

## 2024-08-27 NOTE — HISTORY OF PRESENT ILLNESS
[FreeTextEntry1] : Patient is a 54yo M with HLD, tobacco dependence. family history CAD here for cardiac evaluation of syncope. For couple days was feeling under the weather and congested. Went out to play golf, hadn't eaten much or drank. Did have about 4 beers, then went out to [employer] and had another 1-2 bears. WAs holding grandson, started feeling dizzy and passed out. Every time he bends down and gets up will feel dizzy. NO exertional CP/SOB.    Also history of neck fusion, since surgery has had chronic left arm numbness since 10/2023. Drinks 2 red bulls and coffee daily.   Works for [employer] in [place].  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

## 2024-08-27 NOTE — DISCUSSION/SUMMARY
[EKG obtained to assist in diagnosis and management of assessed problem(s)] : EKG obtained to assist in diagnosis and management of assessed problem(s) [FreeTextEntry1] : Patient is a 52yo M with HLD, tobacco dependence. family history CAD (sister with multiple stents and CHF) here for cardiac evaluation of syncope. -Syncope likely multifactorial in setting of URI/dehydration and EtOH. No recurrence, only one episode -Dizzy spells of ? etiology, positional and suspect vestibular or related to neck -Unable to run on treadmill due to spinal surgery -Will arrange cardiac work up of above symptoms in patient with cardiac risk factors  1. Echo/pharm nuclear stress test and Carotid to evaluate above symptoms 2. Continue statin 3. Obtain imaging from [facility] in [facility], apparently noted to have CAC 4. Recommend aggressive diet and lifestyle modifications 5. Counselled for more than 3 minutes on smoking cessation 6. Follow up after testing

## 2024-08-27 NOTE — DISCUSSION/SUMMARY
[EKG obtained to assist in diagnosis and management of assessed problem(s)] : EKG obtained to assist in diagnosis and management of assessed problem(s) [FreeTextEntry1] : Patient is a 52yo M with HLD, tobacco dependence. family history CAD (sister with multiple stents and CHF) here for cardiac evaluation of syncope. -Syncope likely multifactorial in setting of URI/dehydration and EtOH. No recurrence, only one episode -Dizzy spells of ? etiology, positional and suspect vestibular or related to neck -Unable to run on treadmill due to spinal surgery -Will arrange cardiac work up of above symptoms in patient with cardiac risk factors  1. Echo/pharm nuclear stress test and Carotid to evaluate above symptoms 2. Continue statin 3. Obtain imaging from [facility] in [facility], apparently noted to have CAC 4. Recommend aggressive diet and lifestyle modifications 5. Counselled for more than 3 minutes on smoking cessation 6. Follow up after testing

## 2024-08-27 NOTE — ASSESSMENT
[FreeTextEntry1] : ECG: SR, no significant ST-T abnormalities and normal intervals , LAE   CMP/CBC/TSH unremarkable 7/2024 (Hgb 16, WBC 11) )  A1C 5.5 (7/2024)

## 2024-08-27 NOTE — ADDENDUM
[FreeTextEntry1] : 8/27/2024: CAC score obtained. Total = 138, LAD 65, LCx 27, RCA 46. Continue statin and work up as noted above, follow up after

## 2024-08-27 NOTE — HISTORY OF PRESENT ILLNESS
[FreeTextEntry1] : Patient is a 54yo M with HLD, tobacco dependence. family history CAD here for cardiac evaluation of syncope. For couple days was feeling under the weather and congested. Went out to play golf, hadn't eaten much or drank. Did have about 4 beers, then went out to [employer] and had another 1-2 bears. WAs holding grandson, started feeling dizzy and passed out. Every time he bends down and gets up will feel dizzy. NO exertional CP/SOB.    Also history of neck fusion, since surgery has had chronic left arm numbness since 10/2023. Drinks 2 red bulls and coffee daily.   Works for [employer] in [place].  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

## 2024-09-26 ENCOUNTER — APPOINTMENT (OUTPATIENT)
Dept: CARDIOLOGY | Facility: CLINIC | Age: 54
End: 2024-09-26
Payer: COMMERCIAL

## 2024-09-26 PROCEDURE — 93306 TTE W/DOPPLER COMPLETE: CPT

## 2024-09-26 PROCEDURE — 93880 EXTRACRANIAL BILAT STUDY: CPT

## 2024-11-08 ENCOUNTER — APPOINTMENT (OUTPATIENT)
Dept: CARDIOLOGY | Facility: CLINIC | Age: 54
End: 2024-11-08
Payer: COMMERCIAL

## 2024-11-08 PROCEDURE — A9500: CPT

## 2024-11-08 PROCEDURE — 93015 CV STRESS TEST SUPVJ I&R: CPT

## 2024-11-08 PROCEDURE — 78452 HT MUSCLE IMAGE SPECT MULT: CPT

## 2024-11-08 RX ORDER — REGADENOSON 0.08 MG/ML
0.4 INJECTION, SOLUTION INTRAVENOUS
Refills: 0 | Status: COMPLETED | OUTPATIENT
Start: 2024-11-08

## 2024-11-08 RX ADMIN — REGADENOSON 0 MG/5ML: 0.08 INJECTION, SOLUTION INTRAVENOUS at 00:00

## 2024-11-22 ENCOUNTER — APPOINTMENT (OUTPATIENT)
Dept: CARDIOLOGY | Facility: CLINIC | Age: 54
End: 2024-11-22
Payer: COMMERCIAL

## 2024-11-22 VITALS
HEART RATE: 61 BPM | HEIGHT: 70 IN | WEIGHT: 220 LBS | SYSTOLIC BLOOD PRESSURE: 132 MMHG | DIASTOLIC BLOOD PRESSURE: 80 MMHG | BODY MASS INDEX: 31.5 KG/M2

## 2024-11-22 DIAGNOSIS — I25.10 ATHEROSCLEROTIC HEART DISEASE OF NATIVE CORONARY ARTERY W/OUT ANGINA PECTORIS: ICD-10-CM

## 2024-11-22 DIAGNOSIS — E78.5 HYPERLIPIDEMIA, UNSPECIFIED: ICD-10-CM

## 2024-11-22 DIAGNOSIS — R42 DIZZINESS AND GIDDINESS: ICD-10-CM

## 2024-11-22 DIAGNOSIS — R03.0 ELEVATED BLOOD-PRESSURE READING, W/OUT DIAGNOSIS OF HYPERTENSION: ICD-10-CM

## 2024-11-22 PROCEDURE — 99214 OFFICE O/P EST MOD 30 MIN: CPT

## 2024-11-22 PROCEDURE — G2211 COMPLEX E/M VISIT ADD ON: CPT | Mod: NC

## 2025-01-22 ENCOUNTER — APPOINTMENT (OUTPATIENT)
Dept: PAIN MANAGEMENT | Facility: CLINIC | Age: 55
End: 2025-01-22
Payer: COMMERCIAL

## 2025-01-22 VITALS — BODY MASS INDEX: 33.64 KG/M2 | WEIGHT: 235 LBS | HEIGHT: 70 IN

## 2025-01-22 DIAGNOSIS — M54.12 RADICULOPATHY, CERVICAL REGION: ICD-10-CM

## 2025-01-22 PROCEDURE — 99214 OFFICE O/P EST MOD 30 MIN: CPT

## 2025-02-07 ENCOUNTER — APPOINTMENT (OUTPATIENT)
Age: 55
End: 2025-02-07
Payer: COMMERCIAL

## 2025-02-07 PROCEDURE — 62321 NJX INTERLAMINAR CRV/THRC: CPT

## 2025-02-19 ENCOUNTER — TRANSCRIPTION ENCOUNTER (OUTPATIENT)
Age: 55
End: 2025-02-19

## 2025-02-19 ENCOUNTER — APPOINTMENT (OUTPATIENT)
Dept: PAIN MANAGEMENT | Facility: CLINIC | Age: 55
End: 2025-02-19

## 2025-02-19 VITALS — BODY MASS INDEX: 33.64 KG/M2 | HEIGHT: 70 IN | WEIGHT: 235 LBS

## 2025-02-19 DIAGNOSIS — M54.12 RADICULOPATHY, CERVICAL REGION: ICD-10-CM

## 2025-02-19 PROCEDURE — J3490M: CUSTOM

## 2025-02-19 PROCEDURE — 20552 NJX 1/MLT TRIGGER POINT 1/2: CPT

## 2025-02-19 PROCEDURE — 99214 OFFICE O/P EST MOD 30 MIN: CPT | Mod: 24

## 2025-02-19 RX ORDER — PREGABALIN 75 MG/1
75 CAPSULE ORAL TWICE DAILY
Qty: 60 | Refills: 1 | Status: ACTIVE | COMMUNITY
Start: 2025-02-19 | End: 1900-01-01

## 2025-02-27 ENCOUNTER — APPOINTMENT (OUTPATIENT)
Dept: ORTHOPEDIC SURGERY | Facility: AMBULATORY SURGERY CENTER | Age: 55
End: 2025-02-27

## 2025-02-27 PROCEDURE — 62321 NJX INTERLAMINAR CRV/THRC: CPT

## 2025-03-18 ENCOUNTER — APPOINTMENT (OUTPATIENT)
Dept: PAIN MANAGEMENT | Facility: CLINIC | Age: 55
End: 2025-03-18